# Patient Record
Sex: MALE | Race: WHITE
[De-identification: names, ages, dates, MRNs, and addresses within clinical notes are randomized per-mention and may not be internally consistent; named-entity substitution may affect disease eponyms.]

---

## 2019-08-14 ENCOUNTER — HOSPITAL ENCOUNTER (OUTPATIENT)
Dept: HOSPITAL 74 - FASU-ENDO | Age: 74
Discharge: HOME | End: 2019-08-14
Attending: INTERNAL MEDICINE
Payer: COMMERCIAL

## 2019-08-14 VITALS — DIASTOLIC BLOOD PRESSURE: 54 MMHG | TEMPERATURE: 98 F | SYSTOLIC BLOOD PRESSURE: 92 MMHG | HEART RATE: 74 BPM

## 2019-08-14 VITALS — BODY MASS INDEX: 32.5 KG/M2

## 2019-08-14 DIAGNOSIS — K57.30: ICD-10-CM

## 2019-08-14 DIAGNOSIS — Z86.010: Primary | ICD-10-CM

## 2019-08-14 DIAGNOSIS — K63.89: ICD-10-CM

## 2019-08-14 PROCEDURE — 0DBC8ZX EXCISION OF ILEOCECAL VALVE, VIA NATURAL OR ARTIFICIAL OPENING ENDOSCOPIC, DIAGNOSTIC: ICD-10-PCS | Performed by: INTERNAL MEDICINE

## 2019-08-16 NOTE — PATH
Surgical Pathology Report



Patient Name:  KASSY SO

Accession #:  [id number]

Med. Rec. #:  [id number]                                                        

   /Age/Gender:  1945 (Age: 74) / M

Account:  [id number]                                                          

             Location: [facility]

Taken:  2019

Received:  2019

Reported:  2019

Physicians:  Cale Mcdaniel M.D.

  



Specimen(s) Received

 INFLAMED ILEOCECAL VALVE 





Clinical History

History of polyps

Postoperative diagnosis: Diverticulosis, prominent ileocecal valve







Final Diagnosis

INFLAMED ILEOCECAL VALVE, BIOPSY:

COLONIC MUCOSA WITH FOCAL MILD ACUTE INFLAMMATION AND REACTIVE LYMPHOID

AGGREGATE IN THE LAMINA PROPRIA.

NO ARCHITECTURAL FEATURES OF CHRONICITY IDENTIFIED.









***Electronically Signed***

Jordy Freed M.D.





Gross Description

Received in formalin, labeled "biopsy inflamed ileocecal valve" is a tan,

irregular portion of soft tissue measuring 0.3 cm. in greatest dimension. The

specimen is submitted in toto in one cassette.

/8/15/2019



saudi/8/15/2019